# Patient Record
Sex: MALE | Race: WHITE | NOT HISPANIC OR LATINO | Employment: OTHER | URBAN - METROPOLITAN AREA
[De-identification: names, ages, dates, MRNs, and addresses within clinical notes are randomized per-mention and may not be internally consistent; named-entity substitution may affect disease eponyms.]

---

## 2020-03-31 ENCOUNTER — TELEPHONE (OUTPATIENT)
Dept: NEUROLOGY | Facility: CLINIC | Age: 75
End: 2020-03-31

## 2020-03-31 NOTE — TELEPHONE ENCOUNTER
----- Message from Ryan Cortez sent at 3/31/2020  2:12 PM CDT -----  Contact: Alisia ( spouse ) @ [phone]  Caller calling to r/s the 4-7th apptgabriel call

## 2020-03-31 NOTE — TELEPHONE ENCOUNTER
"Spoke with mrs. Whalen, she was informed there are no in person appts at this time due to COVID 19 concerns, appts are being converted to virtual visits through the portal, offered my assistance in settng up the portal. Mrs. Whalen voiced understanding stating "okay, lets set it up". Portal set up. Pt appt august'd for virtual visit. Mrs. Whalen advised to log into the portal 10 minutes before appt. Mrs. Whalen says thank you.   "

## 2020-04-07 ENCOUNTER — TELEPHONE (OUTPATIENT)
Dept: NEUROLOGY | Facility: CLINIC | Age: 75
End: 2020-04-07

## 2020-04-07 ENCOUNTER — OFFICE VISIT (OUTPATIENT)
Dept: NEUROLOGY | Facility: CLINIC | Age: 75
End: 2020-04-07
Payer: MEDICARE

## 2020-04-07 DIAGNOSIS — G20.A1 PARKINSON'S DISEASE: ICD-10-CM

## 2020-04-07 PROCEDURE — 99204 OFFICE O/P NEW MOD 45 MIN: CPT | Mod: 95,,, | Performed by: PSYCHIATRY & NEUROLOGY

## 2020-04-07 PROCEDURE — 99204 PR OFFICE/OUTPT VISIT, NEW, LEVL IV, 45-59 MIN: ICD-10-PCS | Mod: 95,,, | Performed by: PSYCHIATRY & NEUROLOGY

## 2020-04-07 NOTE — PROGRESS NOTES
The patient location is: home  The chief complaint leading to consultation is: tremor  Visit type: Virtual visit with synchronous audio and video  Total time spent with patient: 50 mins  Each patient to whom he or she provides medical services by telemedicine is:  (1) informed of the relationship between the physician and patient and the respective role of any other health care provider with respect to management of the patient; and (2) notified that he or she may decline to receive medical services by telemedicine and may withdraw from such care at any time.    Notes: below    [facility]EMENT DISORDERS [facility]IN[facility] NEW CONSULT NOTE    PCP/Referring Provider: Aaareferral Self  No address on file  Date of Service: 4/7/2020    Chief Complaint: PD care    HPI: Catrachito Whalen is a R HANDED 74 y.o. male with a medical issues significant for parkinson's, seizures, COPD, pacer, heart valve repair on coumadin, splenectomy, who presents for PD care. Notes he's had PD since 8 years. At first this was a subtle resting tremor of the R hand. This has slowly progressed. Tremor started in his L hand in the last year. Tremor is also present in R>L leg. He struggles to write and use a computer, telephone. He hits keys multiple times. Balance mildly impacted in the last year. He has fallen twice total. Shuffling gait is mild and has started in the last year. Gait is more impacted by SOB than PD.    He's had a total of 2 GTC seizures  Has been on depakote  MRI was not done due to pacer    Mother and daughter with PD - daughter at age 50 - none had seizures  [place] background    Medication history:  Started carbidopa/levodopa 25/100mg 1 tab PO QID and continued  ALSO on stalevo 150 1 tab TID  Takes azilect 1mg QHS    He feels like his tremor comes and goes  At times he has very little tremor    NO carbidopa/levodopa 25/100mg 1 tab PO TID     Neuroleptic exposure:  -None    PD Review of Symptoms:  Anosmia: Yes  Dysarthria/Hypophonia: mildly  hypophonic - has done big and loud  Dysphagia/Sialorrhea: at times mild issues swallowing  Depression: none  Cognitive slowing: mild short term memory issues - forgets appts  Hallucinations: none  Urinary changes: weak stream  Constipation: none  Orthostasis: none  Dyskinesia: none  Falls: 2  Freezing: none  Micrographia: yes  Sleep issues:  -SINDY: none  -RBD: none    Review of Systems:   Review of Systems   Constitutional: Negative for fever.   HENT: Negative for congestion.    Eyes: Negative for double vision.   Respiratory: Negative for cough and shortness of breath.    Cardiovascular: Negative for chest pain and leg swelling.   Gastrointestinal: Negative for nausea.   Genitourinary: Negative for dysuria.   Musculoskeletal: Positive for falls.   Skin: Negative for rash.   Neurological: Positive for tremors and speech change. Negative for headaches.   Psychiatric/Behavioral: Negative for depression, hallucinations and memory loss.         Current Medications:  No outpatient encounter medications on file as of 4/7/2020.     No facility-administered encounter medications on file as of 4/7/2020.        Past Medical History:  Patient Active Problem List   Diagnosis    Parkinson's disease       Past Surgical History:  No past surgical history on file.    Current Living Situation: home    Social:  Social History     Socioeconomic History    Marital status:      Spouse name: Not on file    Number of children: Not on file    Years of education: Not on file    Highest education level: Not on file   Occupational History    Not on file   Social Needs    Financial resource strain: Not on file    Food insecurity:     Worry: Not on file     Inability: Not on file    Transportation needs:     Medical: Not on file     Non-medical: Not on file   Tobacco Use    Smoking status: Not on file   Substance and Sexual Activity    Alcohol use: Not on file    Drug use: Not on file    Sexual activity: Not on file    Lifestyle    Physical activity:     Days per week: Not on file     Minutes per session: Not on file    Stress: Not on file   Relationships    Social connections:     Talks on phone: Not on file     Gets together: Not on file     Attends Mandaen service: Not on file     Active member of club or organization: Not on file     Attends meetings of clubs or organizations: Not on file     Relationship status: Not on file   Other Topics Concern    Not on file   Social History Narrative    Not on file       Family History:  No family history on file.    PHYSICAL:  There were no vitals taken for this visit.  Physical Exam  Constitutional: Well-developed, well-nourished, appears stated age  Eyes: No scleral icterus  ENT: Moist oral mucosa  Cardiovascular: No lower extremity edema   Respiratory: No labored breathing   Skin: No rash   Hematologic: No bruising  Psychiatric: No depression  Other: GI/ deferred   · Mental status: Alert and oriented to person, place, time, and situation;   ·   · Speech: normal (not dysarthric), no aphasia  · Cranial nerves:            · CN II: Pupils mid-position and equal, not tested light or accommodation  · CN III, IV, VI: Extraocular movements full, no nystagmus visualized  · CN V: Not tested   · CN VII: Face strong and symmetric bilaterally   · CN VIII: Hearing intact to voice and conversation   · CN IX, X: Palate raises midline and symmetric   · CN XI: Strong shoulder shrug B   · CN XII: Tongue appears midline   · Motor: Normal bulk by appearance, no drift   · Sensory: Not tested    · Gait: mild shuffle  · Deep tendon reflexes: Not tested  · Movement/Coordination                    Mod  hypophonic speech.                     Mod facial masking.                    No other dystonia, chorea, athetosis, myoclonus, or tics visualized.    Bradykinesia  ? Finger taps Finger flicks RITO Heel taps   Left 1+ 1+ 1+ -   Right 2+ 2+ 1+ -       Tremor Exam   Arms extended Arms in wing position,  fingers almost touching Re-emergent Arms extended wrists extended Intention Resting Kinetic   Left ? ? ? ? ? ? ?   Right ? ? ? ? ? ?++ ?   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 No hx genetic testing. He's currently struggling with tremors. It appears that his current regimen of carbidopa/levodopa plus stalevo is not sufficiently covering OFFtime. Suggsted convert to Rytary 95mg 4 tabs TID. Continue azilect.                 Follow-up: 1mo.    April Guevara MD, MS [facility]  Department of Neurology  Movement Disorders

## 2020-04-07 NOTE — ASSESSMENT & PLAN NOTE
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 No hx genetic testing. He's currently struggling with tremors. It appears that his current regimen of carbidopa/levodopa plus stalevo is not sufficiently covering OFFtime. Suggsted convert to Rytary 95mg 4 tabs TID. Continue azilect.

## 2020-04-07 NOTE — TELEPHONE ENCOUNTER
----- Message from Sandy Gil sent at 4/7/2020  2:05 PM CDT -----  Contact:   Catrachito      [phone]      Wife says they are having problems trying to get into the portal for today's 2pm appt. W/you.   Pls call asap.

## 2022-05-05 ENCOUNTER — PATIENT MESSAGE (OUTPATIENT)
Dept: RESEARCH | Facility: HOSPITAL | Age: 77
End: 2022-05-05
Payer: MEDICARE